# Patient Record
(demographics unavailable — no encounter records)

---

## 2024-12-28 NOTE — HISTORY OF PRESENT ILLNESS
[de-identified] : Patient presents today with  for evaluation of right knee pain.  The patient reports of getting out of a car couple days ago when the leg buckled.  She reported increased right knee pain.  The patient reports a history of transverse myelitis with having chronic right leg pain.  At time she reports the pain is now worse in the knee region. She has been intermittently taking ibuprofen.  She does report of some limited improvement while taking ibuprofen.  She does ambulate with the use of a cane because of the spinal condition.  Her primary care sent her for an MRI.  She is here today for follow-up.  Review of Systems- Constitutional: No fever or chills.  Cardiovascular: No orthopnea or chest pain Pulmonary: No shortness of breath.  GI: No nausea or vomiting or abdominal pain. Musculoskeletal: see HPI  Psychiatric: No anxiety and depression.

## 2024-12-28 NOTE — REVIEW OF SYSTEMS
[Joint Pain] : joint pain [Joint Stiffness] : joint stiffness [Joint Swelling] : joint swelling [Negative] : Endocrine [FreeTextEntry9] : right knee

## 2024-12-28 NOTE — PHYSICAL EXAM
[de-identified] : The patient is conversive and in no apparent distress. The patient is alert and oriented to person, place, and time. Affect and mood appear normal. The head is normocephalic and atraumatic. Skin shows normal turgor with no evidence of eczema or psoriasis. No respiratory distress noted. Sensation grossly intact. MUSCULOSKELETAL:   SEE BELOW  Right lower leg exam demonstrates skin is clean, dry intact.  No surgical scars.  No signs of acute trauma.  No erythema or ecchymosis.  Neutral alignment in the seated position.  Normal temperature.  There is a large effusion appreciated.  Range of motion 0 degrees of extension to approximate 95 degrees of flexion which is limited by both the effusion as well as the body habitus.  No significant knee laxity is appreciated.  No specific tenderness is appreciated at this time which is likely related to the ongoing transverse myelitis.  Varicose veins are noted of the right lower leg.  No open wounds.  No active cellulitis.  Sensation is present but inconsistent because of the spinal condition. [de-identified] : 4 view right knee x-rays were reviewed.  Diffuse cartilage thinning is appreciated.  Early medial and posterior femoral condylar osteophyte noted.  MRI of the right knee dated December 27, 2024 was reviewed.  Large effusion.  Full-thickness cartilage loss is noted of the medial compartment.

## 2024-12-28 NOTE — DISCUSSION/SUMMARY
[de-identified] : 30 minutes was spent ordering tests, reviewing past office notes, examining the patient, discussing the clinical presentation and findings, communicating and explaining the diagnosis, ordering medication, providing orthopedic education and documenting the visit in the electronic medical record.  The MRI as well as the x-rays taken today reviewed with the patient extensively.  She does have diffuse degenerative changes but particularly has full-thickness cartilage loss of the medial compartment.  This was explained to the patient and her  in detail.  The patient was offered a knee aspiration as well as corticosteroid injection to address the effusion and ongoing knee pain.  Patient was concerned because of pain associated with the transverse myelitis condition making the pain worse.  At this time she would like to manage with oral medications.  A discussion was held regarding ibuprofen versus meloxicam.  She was prescribed meloxicam and to be taken for a few days then discontinued as needed.  The patient will continue to follow.  If she continues to have swelling in her knee pain and possible buckling she is recommended orthopedic follow-up for an aspiration and corticosteroid injection.  She demonstrated understanding.  She will continue with the use of a cane.  She is already limited her activities because of her ongoing condition and so she will perform activities as needed.  She may consider the use of a compression sleeve for the knee but understands this may be difficult because of the shape of the leg.  All questions were answered to the patient's satisfaction.

## 2025-01-02 NOTE — HISTORY OF PRESENT ILLNESS
[de-identified] : 01/02/2025  ALAN THOMAS 59 year y/o F presents today for right knee pain. Patient reports on 12/22/24 she stepped out of her car and feels she hyperextended her knee. Patient reports intermittent sharp pain in the medial aspect of the R knee. Patient reports she went to her PCP and had an MRI at [facility] (does not have disc). MRI shows medial and lateral meniscus tears. Patient reports she had an XR with a PA in [facility] but does not have disc.  Patient reports history of transverse myelitis, diminished sensation in the lower extremities is reported   Date of Onset: Patient reports she stepped out of her car and feels she hyperextended her knee. Patient reports she felt pain immediately in her knee.  Mechanism of injury: 12/22/24   Pain:    At Rest: 3/10    With Activity: 10/10   Have you been treated for this in the past? Yes, patient saw  OTC/Rx Medication: Patient reports taking advil PRN with relief Heat, Ice, Elevation: Ice PRN with relief Previous Imaging/Studies: MRI at [place] [facility]

## 2025-01-02 NOTE — DISCUSSION/SUMMARY
[de-identified] :  ALAN THOMAS 59 year F was seen and evaluated in office today. Following evaluation, the natural history of the pathology was explained in full to the patient in layman's terms.   Several different treatment options were discussed and explained in full to the patient, along with specific risks and benefits of both surgical and non-surgical treatments. Nonsurgical options including but not limited to Corticosteroid Injection, Visco supplementation, Prescription anti-inflammatory medications (both steroidal and non-steroidal), activity modification, non-impact exercise, maintaining a healthy BMI, bracing, and icing were all reviewed. Surgical options including but not limited to arthroscopy, and joint replacement along with other indicated procedures were discussed.   Discussed risk of morbidity associated with proposed treatment plans. These risks include, but are not limited to, the risk associated with prescription strength medications and possible side effects of these medications which include GI hemorrhage with oral medications along with cardiac/renal issues with long term use. Risks with all pertinent surgical interventions as discussed with patient including infection, bleeding, anesthesia complications, NV injury, fracture, DVT, or heterotopic ossification.   Furthermore, discussed with ALAN that they could also delay any immediate treatment options and continue to observe and self-care for the discussed problem. Discussed Home Exercise Programs as well as Rest, Ice and elevation. Patient had ample time to ask any questions about todays visit and the diagnosis, and all questions were answered. Patient understands the plan going forward.   Lastly, based on this patient's presentation at our office, which is an orthopedic specialist office, this patient inherently / intrinsically has a risk of progression of symptoms/condition, as well as development and/or exacerbation of cardiovascular disease/conditions, obesity, DVT, worsening and chronic pain, and permanent loss of function, as well as decreased abilities to complete activities of daily life.  PLAN:   At this time the patient was advised on weight management. Discussed that healthy lifestyle choices including dieting and exercise can promote healthy weight management.   The patient has been informed they need to get their BMI under 40 to be a candidate for the procedure. They understand that having a BMI over 40 puts them at high risk for infection. They understand that if they get an infection in bone, it can be there for life. The importance of addressing weight loss has been stressed and the patient's questions were answered.   Patient had already completed MRI at this time, does not present today with imaging, however review of report and reviewed MRI with noted full thickness cartilage loss in the medial compartment with associated medial and lateral meniscus tears  At this time, discussed options with patient, including CSI, PT, HEP.  Patient deferred these options and would like to continue to observe and do HEP.  At this time the plan that the patient wishes to move forward with is to observe and continue self care including but not limited to HEP, Ice, NSAIDs and rest as needed. Patient was instructed to f/u in 6 weeks if their symptoms do not improve or if there are any further concerns.   Entered by Thomas Rosado acting as scribe. Dr. Angelo Attestation The documentation recorded by the scribe, in my presence, accurately reflects the service I, Dr. Angelo, personally performed, and the decisions made by me with my edits as appropriate.

## 2025-01-02 NOTE — DATA REVIEWED
[MRI] : MRI [Right] : of the right [Knee] : knee [Report was reviewed and noted in the chart] : The report was reviewed and noted in the chart

## 2025-01-02 NOTE — PHYSICAL EXAM
[Right] : right knee [AP] : anteroposterior [Lateral] : lateral [[place]] : skyline [There are no fractures, subluxations or dislocations. No significant abnormalities are seen] : There are no fractures, subluxations or dislocations. No significant abnormalities are seen [Moderate tricompartmental OA medial narrowing] : Moderate tricompartmental OA medial narrowing [Mild patellofemoral OA] : Mild patellofemoral OA [de-identified] : Constitutional: The patient appears well developed, well nourished. Examination of patients ability to communicate functionally was normal.       Neurologic: Coordination is normal. Alert and oriented to time, place and person. No evidence of mood disorder, calm affect.       RIGHT     KNEE  : Inspection of the knee is as follows: moderate effusion. no ecchymosis, no streaking, no erythema, no atrophy, no deformities of the quad tendon and no deformities of patellar tendon.       Palpation of the knee is as follows: medial joint line tenderness, lateral joint line tenderness, medial facet of patella tenderness, lateral facet of patella tenderness and crepitus. no palpable masses and no increased warmth.       Knee Range of Motion is as follows in degrees:        Extension: -1    Flexion: 115        Strength examination of the knee is as follows:    Quadriceps strength is 5/5    Hamstring strength is 5/5       Ligament Stability and Special Test ligamentously stable, negative anterior draw, negative Lachman test, negative posterior draw and no varus or valgus instability.    negative McMurrays test and able to do active straight leg raise without an extensor lag.       Neurological examination of the knee is as follows: light touch is intact throughout.       Gait and function is as follows: mildly antalgic gait. [FreeTextEntry9] : Medial joint space narrowing with osteophytes noted tricompartementally

## 2025-02-12 NOTE — PHYSICAL EXAM
[de-identified] : Constitutional: The patient appears well developed, well nourished. Examination of patients ability to communicate functionally was normal.       Neurologic: Coordination is normal. Alert and oriented to time, place and person. No evidence of mood disorder, calm affect.       RIGHT     KNEE  : Inspection of the knee is as follows: moderate effusion. no ecchymosis, no streaking, no erythema, no atrophy, no deformities of the quad tendon and no deformities of patellar tendon.       Palpation of the knee is as follows: medial joint line tenderness, lateral joint line tenderness, medial facet of patella tenderness, lateral facet of patella tenderness and crepitus. no palpable masses and no increased warmth.       Knee Range of Motion is as follows in degrees:        Extension: -1    Flexion: 115        Strength examination of the knee is as follows:    Quadriceps strength is 5/5    Hamstring strength is 5/5       Ligament Stability and Special Test ligamentously stable, negative anterior draw, negative Lachman test, negative posterior draw and no varus or valgus instability.    negative McMurrays test and able to do active straight leg raise without an extensor lag.       Neurological examination of the knee is as follows: light touch is intact throughout.       Gait and function is as follows: mildly antalgic gait.

## 2025-02-12 NOTE — DISCUSSION/SUMMARY
[de-identified] : ALAN THOMAS 59 year  F was seen and evaluated in office today. Following evaluation, the natural history of the pathology was explained in full to the patient in layman's terms. At this time, they explained to the patient that based on physical exam and imaging review, patient likely has Medial/lateral meniscal tear of the knee. Discussed with patients that due to this meniscal tear, they are at increased risk of developing knee osteoarthritis. Discussed further with patient that due to the fragile blood supply of the meniscus and the nature of the condition, it is unlikely that the tear will heal on its own, and they will likely experience constant/episodic pain in the knee limiting ADLs. Discussed that due to the meniscal tear and the associated increased risk of progression to knee osteoarthritis ultimately leading to an eventual need for knee arthroplasty in the future.    In the interim, discussed with patient several different treatment options, along with specific risks and benefits of both surgical and non-surgical treatments. Nonsurgical options including but not limited to Corticosteroid Injection, Visco supplementation series, Meloxicam 15mg, Medrol Dose Pack, along with activity modification such as non-impact exercise. Risk of morbidity associated with proposed treatments were discussed. Risks include, but are not limited to, the risk associated with prescription strength medications and possible side effects of these medications which include GI hemorrhage with oral medications along with cardiac/renal issues with long term use  Furthermore, discussed with ALAN that they could also delay any immediate treatment options and continue to observe and self-care for the discussed problem. Discussed Home Exercise Programs as well as Rest, Ice and elevation. Patient had ample time to ask any questions about todays visit and the diagnosis, and all questions were answered. Patient understands the plan going forward.    Based on this patient's presentation at our office, which is an orthopedic specialist office, this patients condition is expected to progress. Patient, due to this condition has the risk of development and/or exacerbation of cardiovascular disease/conditions, obesity, DVT, worsening and chronic pain, and permanent loss of function, as well as decreased abilities to complete activities of daily life.   At this time the patient would like toconsider surgical intervention further. In the interim patient is indicated for RIGHT KNEE CSI  Patient was given a CSI to the RIGHT KNEE via the superomedial portal, advised to ice if sore and will observe over the next 24 hours for any redness, swelling or increased discomfort. The indication for this procedure was pain and inflammation. The site was prepped with betadine and sterile technique used. An injection of Lidocaine 5cc of 1% was used Betamethasone (Celestone) 1cc of 6mg.  The patient tolerated procedure well. They were advised to call if redness, pain or fever occur and apply ice for 15 minutes out of every hour for the next 12-24 hours as tolerated. The risks benefits, and alternatives have been discussed. These risks include infection, hemarthrosis, allergic reaction, bleeding and hyperglycemia. Verbal understanding and consent was obtained. There is a moderate risk of morbidity with further treatment, especially from use of prescription strength medication.   The patient f/u in 1 month.   Entered by Thomas Rosado acting as scribe. Dr. Angelo Attestation The documentation recorded by the scribe, in my presence, accurately reflects the service I, Dr. Angelo, personally performed, and the decisions made by me with my edits as appropriate.

## 2025-02-12 NOTE — HISTORY OF PRESENT ILLNESS
[de-identified] : 02/12/2025 ALAN THOMAS 59 year y/o F presents today for follow up on right knee pain Treatments since last visit: HEP and walking frequently, Advil PRN with relief Changes Since Last Visit: Patient reports her pain has slightly improved since last visit. Patient reports her knee is still buckling on her.  Patient with hx of Right sided transverse myelitis 01/02/2025  ALAN THOMAS 59 year y/o F presents today for right knee pain. Patient reports on 12/22/24 she stepped out of her car and feels she hyperextended her knee. Patient reports intermittent sharp pain in the medial aspect of the R knee. Patient reports she went to her PCP and had an MRI at [facility] (does not have disc). MRI shows medial and lateral meniscus tears. Patient reports she had an XR with a PA in [facility] but does not have disc.  Patient reports history of transverse myelitis, diminished sensation in the lower extremities is reported Patient still notes buckling of the knee and pain medial and lateral aspect of the knee.    Date of Onset: Patient reports she stepped out of her car and feels she hyperextended her knee. Patient reports she felt pain immediately in her knee.  Mechanism of injury: 12/22/24   Pain:    At Rest: 3/10    With Activity: 10/10   Have you been treated for this in the past? Yes, patient saw  OTC/Rx Medication: Patient reports taking advil PRN with relief Heat, Ice, Elevation: Ice PRN with relief Previous Imaging/Studies: MRI at [place] [facility]

## 2025-02-12 NOTE — DATA REVIEWED
[MRI] : MRI [Right] : of the right [Knee] : knee [Report was reviewed and noted in the chart] : The report was reviewed and noted in the chart [FreeTextEntry1] : full thickness cartilage loss in the medial compartment with associated medial and lateral meniscus tears